# Patient Record
Sex: FEMALE | Race: WHITE | NOT HISPANIC OR LATINO
[De-identification: names, ages, dates, MRNs, and addresses within clinical notes are randomized per-mention and may not be internally consistent; named-entity substitution may affect disease eponyms.]

---

## 2017-10-18 ENCOUNTER — RX ONLY (OUTPATIENT)
Age: 22
Setting detail: RX ONLY
End: 2017-10-18

## 2018-03-07 ENCOUNTER — APPOINTMENT (RX ONLY)
Dept: URBAN - NONMETROPOLITAN AREA CLINIC 4 | Facility: CLINIC | Age: 23
Setting detail: DERMATOLOGY
End: 2018-03-07

## 2018-03-07 DIAGNOSIS — L70.0 ACNE VULGARIS: ICD-10-CM

## 2018-03-07 PROCEDURE — ? PRODUCT LINE (OFFICE PRODUCTS)

## 2018-03-07 ASSESSMENT — LOCATION ZONE DERM: LOCATION ZONE: FACE

## 2018-03-07 ASSESSMENT — LOCATION SIMPLE DESCRIPTION DERM: LOCATION SIMPLE: LEFT CHEEK

## 2018-03-07 ASSESSMENT — LOCATION DETAILED DESCRIPTION DERM: LOCATION DETAILED: LEFT SUPERIOR LATERAL MALAR CHEEK

## 2018-03-07 NOTE — PROCEDURE: PRODUCT LINE (OFFICE PRODUCTS)
Product 56 Units: 0
Product 41 Price (In Dollars - Numeric Only, No Special Characters Or $): 0.00
Detail Level: Zone
Product 1 Units: 1
Product 1 Application Directions: Apply 3 x a week
Product 1 Price (In Dollars - Numeric Only, No Special Characters Or $): 45.00
Name Of Product 1: Acne triple gel
Render Product Pricing In Note: Yes
Send Charges To Patient Encounter: No

## 2018-03-19 ENCOUNTER — APPOINTMENT (RX ONLY)
Dept: URBAN - NONMETROPOLITAN AREA CLINIC 13 | Facility: CLINIC | Age: 23
Setting detail: DERMATOLOGY
End: 2018-03-19

## 2018-03-19 DIAGNOSIS — L23.3 ALLERGIC CONTACT DERMATITIS DUE TO DRUGS IN CONTACT WITH SKIN: ICD-10-CM

## 2018-03-19 DIAGNOSIS — L70.0 ACNE VULGARIS: ICD-10-CM

## 2018-03-19 PROBLEM — F41.9 ANXIETY DISORDER, UNSPECIFIED: Status: ACTIVE | Noted: 2018-03-19

## 2018-03-19 PROCEDURE — 99213 OFFICE O/P EST LOW 20 MIN: CPT

## 2018-03-19 PROCEDURE — ? PRODUCT LINE (OFFICE PRODUCTS)

## 2018-03-19 PROCEDURE — ? TREATMENT REGIMEN

## 2018-03-19 PROCEDURE — ? ADDITIONAL NOTES

## 2018-03-19 ASSESSMENT — LOCATION DETAILED DESCRIPTION DERM
LOCATION DETAILED: LEFT INFERIOR FOREHEAD
LOCATION DETAILED: LEFT INFERIOR LATERAL MALAR CHEEK

## 2018-03-19 ASSESSMENT — LOCATION SIMPLE DESCRIPTION DERM
LOCATION SIMPLE: LEFT CHEEK
LOCATION SIMPLE: LEFT FOREHEAD

## 2018-03-19 ASSESSMENT — LOCATION ZONE DERM: LOCATION ZONE: FACE

## 2018-03-19 NOTE — PROCEDURE: PRODUCT LINE (OFFICE PRODUCTS)
Name Of Product 1: Acne Triple Gel Combination
Product 23 Units: 0
Product 5 Application Directions: Wash face every night with a mild soap. No scrubbing. Allow face to dry. Apply Monday, Wednesday and Friday for 3 weeks. Then increase to every other night for 3 weeks. Then every night as tolerated.
Product 12 Price (In Dollars - Numeric Only, No Special Characters Or $): 0.00
Name Of Product 6: Wart Cream
Detail Level: Zone
Product 3 Application Directions: Apply to face every night. Leave on all night. Wash off in the am. Lot# [id number]
Product 1 Price (In Dollars - Numeric Only, No Special Characters Or $): 45
Product 2 Application Directions: Apply 1 drop to affected nails every night for 1 year.
Product 1 Application Directions: Wash face at bedtime with a mild soap. Allow face to dry for 15 minutes. Apply medication. Leave on all night. Wash off in the am.
Product 6 Price (In Dollars - Numeric Only, No Special Characters Or $): 40
Name Of Product 3: Dapsone Gel
Name Of Product 5: Tretinoin 0.025% Hydrating Cream
Product 3 Price (In Dollars - Numeric Only, No Special Characters Or $): no charge
Product 6 Application Directions: Soak warts in lukewarm water am and pm with tape on. Remove tape. Scrape off dead skin. Apply medication. Allow to dry. Re-tape.
Product 3 Units: 1
Render Product Pricing In Note: No
Product 4 Application Directions: Wash face every night with lukewarm water. No scrubbing. Allow face to dry. Apply medication. Leave on all night and wash off in the am
Name Of Product 4: Rosacea Triple Gel Combination
Name Of Product 2: Anti-Fungal Nail Solution

## 2018-03-19 NOTE — PROCEDURE: TREATMENT REGIMEN
Detail Level: Zone
Initiate Treatment: Dc all acne meds x 1 week apply CeraVe lotion TID and to start the Dapsone after the week.   She was told to call if any problems.
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
## 2018-11-02 ENCOUNTER — APPOINTMENT (RX ONLY)
Dept: URBAN - NONMETROPOLITAN AREA CLINIC 13 | Facility: CLINIC | Age: 23
Setting detail: DERMATOLOGY
End: 2018-11-02

## 2018-11-02 DIAGNOSIS — L70.0 ACNE VULGARIS: ICD-10-CM

## 2018-11-02 PROCEDURE — ? PRODUCT LINE (OFFICE PRODUCTS)

## 2018-11-02 ASSESSMENT — LOCATION DETAILED DESCRIPTION DERM: LOCATION DETAILED: LEFT INFERIOR FOREHEAD

## 2018-11-02 ASSESSMENT — LOCATION SIMPLE DESCRIPTION DERM: LOCATION SIMPLE: LEFT FOREHEAD

## 2018-11-02 ASSESSMENT — LOCATION ZONE DERM: LOCATION ZONE: FACE

## 2018-11-02 NOTE — PROCEDURE: PRODUCT LINE (OFFICE PRODUCTS)
Product 38 Units: 0
Product 6 Price (In Dollars - Numeric Only, No Special Characters Or $): 40
Product 5 Application Directions: Wash face every night with a mild soap. No scrubbing. Allow face to dry. Apply Monday, Wednesday and Friday for 3 weeks. Then increase to every other night for 3 weeks. Then every night as tolerated.
Product 2 Application Directions: Apply 1 drop to affected nails every night for 1 year.
Product 18 Price (In Dollars - Numeric Only, No Special Characters Or $): 0.00
Product 4 Price (In Dollars - Numeric Only, No Special Characters Or $): 45
Name Of Product 1: Acne Triple Gel Combination
Product 4 Application Directions: Wash face every night with lukewarm water. No scrubbing. Allow face to dry. Apply medication. Leave on all night and wash off in the am
Name Of Product 5: Tretinoin 0.025% Hydrating Cream
Detail Level: Zone
Name Of Product 3: Dapsone Gel
Name Of Product 4: Rosacea Triple Gel Combination
Send Charges To Patient Encounter: No
Product 3 Application Directions: Apply to face every night. Leave on all night. Wash off in the am. Lot# [id number]
Product 1 Application Directions: Wash face at bedtime with a mild soap. Allow face to dry for 15 minutes. Apply medication. Leave on all night. Wash off in the am.
Product 6 Application Directions: Soak warts in lukewarm water am and pm with tape on. Remove tape. Scrape off dead skin. Apply medication. Allow to dry. Re-tape.
Product 3 Price (In Dollars - Numeric Only, No Special Characters Or $): 45.00
Product 3 Units: 1
Name Of Product 6: Wart Cream
Name Of Product 2: Anti-Fungal Nail Solution